# Patient Record
Sex: FEMALE | Race: WHITE | NOT HISPANIC OR LATINO | Employment: FULL TIME | URBAN - METROPOLITAN AREA
[De-identification: names, ages, dates, MRNs, and addresses within clinical notes are randomized per-mention and may not be internally consistent; named-entity substitution may affect disease eponyms.]

---

## 2023-09-19 PROBLEM — E66.3 OVERWEIGHT WITH BODY MASS INDEX (BMI) OF 29 TO 29.9 IN ADULT: Status: ACTIVE | Noted: 2023-09-19

## 2023-09-19 PROBLEM — R06.2 WHEEZING: Status: ACTIVE | Noted: 2023-09-19

## 2023-09-19 PROBLEM — R05.3 CHRONIC COUGH: Status: ACTIVE | Noted: 2023-09-19

## 2023-09-19 PROBLEM — R53.83 FATIGUE: Status: ACTIVE | Noted: 2023-09-19

## 2023-09-19 PROBLEM — K21.9 GERD (GASTROESOPHAGEAL REFLUX DISEASE): Status: ACTIVE | Noted: 2023-09-19

## 2023-09-19 PROBLEM — J44.9 OAD (OBSTRUCTIVE AIRWAY DISEASE) (MULTI): Status: ACTIVE | Noted: 2023-09-19

## 2023-09-19 RX ORDER — ALBUTEROL SULFATE 90 UG/1
2 AEROSOL, METERED RESPIRATORY (INHALATION) EVERY 4 HOURS PRN
COMMUNITY
Start: 2023-06-15

## 2023-09-19 RX ORDER — VARENICLINE TARTRATE 0.5 MG/1
TABLET, FILM COATED ORAL
COMMUNITY
Start: 2022-08-26 | End: 2023-10-12 | Stop reason: WASHOUT

## 2023-09-19 RX ORDER — FLUTICASONE PROPIONATE AND SALMETEROL 100; 50 UG/1; UG/1
1 POWDER RESPIRATORY (INHALATION) 2 TIMES DAILY
COMMUNITY
Start: 2023-07-13 | End: 2023-10-12 | Stop reason: SDUPTHER

## 2023-10-12 ENCOUNTER — OFFICE VISIT (OUTPATIENT)
Dept: PULMONOLOGY | Facility: CLINIC | Age: 48
End: 2023-10-12
Payer: COMMERCIAL

## 2023-10-12 VITALS
HEIGHT: 67 IN | TEMPERATURE: 97.6 F | OXYGEN SATURATION: 96 % | WEIGHT: 211.6 LBS | SYSTOLIC BLOOD PRESSURE: 109 MMHG | BODY MASS INDEX: 33.21 KG/M2 | HEART RATE: 83 BPM | RESPIRATION RATE: 16 BRPM | DIASTOLIC BLOOD PRESSURE: 74 MMHG

## 2023-10-12 DIAGNOSIS — J44.9 CHRONIC OBSTRUCTIVE PULMONARY DISEASE, UNSPECIFIED COPD TYPE (MULTI): Primary | ICD-10-CM

## 2023-10-12 PROCEDURE — 99214 OFFICE O/P EST MOD 30 MIN: CPT

## 2023-10-12 RX ORDER — CEPHALEXIN 250 MG/1
1 CAPSULE ORAL
Qty: 60 EACH | Refills: 11 | Status: SHIPPED | OUTPATIENT
Start: 2023-10-12 | End: 2024-02-07 | Stop reason: SDUPTHER

## 2023-10-12 NOTE — PATIENT INSTRUCTIONS
COPD  - Continue Advair 1 puff twice a day - rinse your mouth after each use to avoid oral thrush.  - Continue an albuterol HFA inhaler every 4-6 hours as needed     Post nasal drip/ seasonal allergies  - Continue cetirizine 10mg daily  - Continue flonase 1 spray each nostril daily     GERD  - Continue to take Nexium 20mg daily     Follow up in 12 months or sooner if needed

## 2023-10-12 NOTE — PROGRESS NOTES
Patient: Teresa Dooley    [id number]  : 1975 -- AGE 47 y.o.    Provider: [facility]     Location [facility]   Service Date: 10/12/2023              [facility]  Follow Up Visit Note      HISTORY OF PRESENT ILLNESS       HISTORY OF PRESENT ILLNESS   JHON Dooley is a 47 y.o. female who presents to a [facility] for a follow up evaluation for COPD. He is a former smoker (39 pack years).     I have independently interviewed and examined the patient in the office and reviewed available records.    Current History    On today's visit, the patient reports taking Advair twice a day. She states she is feeling great, that advair has made a huge difference, she says she was able to play volleyball last night. She rarely coughs or wheezes anymore and has not needed to use her albuterol inhaler. She denies GARCIA, SOB at rest, chest pain and ER visits for breathing issues.    23: Since last visit she states the albuterol has helped her wheezing and cough significantly. She uses the albuterol once a day mainly in the morning d/t feeling crummy in the mornings. She occasionally coughs and sometimes can hear wheezing after cutting the grass, albuterol helps with this. She is taking nexium for GERD with good control.      23: Mrs. Dooley has c/o of chronic cough for a couple years. She quit smoking 4 1/2 months ago and still has a cough, she coughs up clear mucus at times. She reports wheezing as well. She reports helping her  paint their boat last week then she was wheezing for 4 days. She wakes coughing with SOB and wheezing once a week or two for the past few months. She reports some seasonal allergies with nasal congestion. She reports SOB is more cough induced. She has chest tightness at times. She reports having GERD since she quit smoking and was taking too many TUMS so she started taking 20mg of  Nexium daily with good control for the past month now. Denies GARCIA and ER visits for breathing issues.    Previous pulmonary history: COPD; GOLD1    Inhalers/nebulized medications: advair, albutrol    Hospitalization History: He has not been hospitalized over the last year for breathing related problem.    Sleep history: Denies snoring, apnea, feeling tired during the day or taking naps during the day.     ALLERGIES AND MEDICATIONS     ALLERGIES  Allergies   Allergen Reactions    Erythromycin Unknown       MEDICATIONS  Current Outpatient Medications   Medication Sig Dispense Refill    albuterol 90 mcg/actuation inhaler Inhale 2 puffs every 4 hours if needed.      fluticasone propion-salmeteroL (Advair Diskus) 100-50 mcg/dose diskus inhaler Inhale 1 puff 2 times a day.      varenicline (Chantix) 0.5 mg tablet Take by mouth.  start 0.5mg po qd x3d, then 0.5mg po bid x4d, then 1mg po bid thereafter. max 2mg/day.       No current facility-administered medications for this visit.         PAST HISTORY     PAST MEDICAL HISTORY  She  has a past medical history of Encounter for gynecological examination (general) (routine) without abnormal findings and Other conditions influencing health status.    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   No known exposure to asbestos, silica, beryllium or inhaled metals.  No exposure to birds or exotic  animals.    FAMILY HISTORY  Family History   Problem Relation Name Age of Onset    Breast cancer Mother       Father - Emphysema  Maternal Aunt - Asthma  Mother - Lung cancer -.  No family history of autoimmune disorders.    REVIEW OF SYSTEMS     REVIEW OF SYSTEMS  Review of Systems    Constitutional: No fever, no chills, no night sweats.    Eyes: No double vision, no floaters, no dry eyes.   ENT: See HPI.   Neck: No neck stiffness.  Cardiovascular: No sharp chest pain, no heart racing, no leg swelling.  Respiratory: as noted in HPI.   Integumentary: No rashes or sores.  Neurological: No dizziness, no headaches. Sleeping well.  Psychiatric: No mood changes.       PHYSICAL EXAM     VITAL SIGNS:   Vitals:    10/12/23 0908   BP: 109/74   Pulse: 83   Resp: 16   Temp: 36.4 °C (97.6 °F)   SpO2: 96%     CURRENT WEIGHT: Body mass index is 33.14 kg/m².    PREVIOUS WEIGHTS:  Wt Readings from Last 3 Encounters:   23 93 kg (205 lb)   23 93.2 kg (205 lb 6.4 oz)   22 86.6 kg (191 lb)       Physical Exam    Constitutional: General appearance: Alert and oriented.  No acute distress. Well developed, well nourished.  Head and face: Symmetric  Pulmonary: Chest is normal. No increased work of breathing or signs of respiratory distress. Clear to auscultation bilaterally - no crackles, wheezing, or rhonchi.   Cardiovascular: Heart rate and rhythm normal. Normal S1, S2 - no murmurs, gallops, or pericardial rub.   Abdomen: Soft, non tender, +BS  Extremities: No edema. No clubbing or cyanosis of the fingernails.    Neurologic: Moves all four extremities   MSK: Normal movements of extremities. Gait normal   Psychiatric: Intact judgement and insight.    RESULTS/DATA     Pulmonary Function Test Results     Pulmonary Functions Testing Results:  23: FEV1/FVC 0.66 /FEV1 2.21 (72%)/FVC 3.35 (88%)/ TLC 8.31 (148%)/ RV 5.83 (312%)/ DLCO 98%    FENO 10/12/23: 14ppb        Chest Radiograph     No results found for this  or any previous visit from the past 2000 days.      Chest CT Scan     No results found for this or any previous visit from the past 365 days.      Echocardiogram     No results found for this or any previous visit from the past 365 days.         ASSESSMENT/PLAN     Mckenna Dooley is 46 y/o female who presents to a [facility] for a follow up evaluation for COPD. He is a former smoker (39 pack years).     Problem List and Orders  Diagnoses and all orders for this visit:  Chronic obstructive pulmonary disease, unspecified COPD type (CMS/[facility])  -     fluticasone propion-salmeteroL (Advair Diskus) 100-50 mcg/dose diskus inhaler; Inhale 1 puff 2 times a day. Rinse mouth with water after use to reduce aftertaste and incidence of candidiasis. Do not swallow.      Assessment and Plan / Recommendations:     Chronic Cough/ Wheezing  - START Advair 1 puff twice a day - rinse your mouth after each use to avoid oral thrush.  - Continue an albuterol HFA inhaler every 4-6 hours as needed    Post nasal drip/ seasonal allergies  - Continue cetirizine 10mg daily  - Continue flonase 1 spray each nostril daily     GERD  - Continue to take Nexium 20mg daily     Follow up in 12 months

## 2023-10-19 ENCOUNTER — APPOINTMENT (OUTPATIENT)
Dept: PULMONOLOGY | Facility: CLINIC | Age: 48
End: 2023-10-19
Payer: COMMERCIAL

## 2023-10-27 ENCOUNTER — OFFICE VISIT (OUTPATIENT)
Dept: PRIMARY CARE | Facility: CLINIC | Age: 48
End: 2023-10-27
Payer: COMMERCIAL

## 2023-10-27 VITALS
BODY MASS INDEX: 33.74 KG/M2 | TEMPERATURE: 97.1 F | DIASTOLIC BLOOD PRESSURE: 62 MMHG | HEART RATE: 68 BPM | OXYGEN SATURATION: 97 % | RESPIRATION RATE: 16 BRPM | SYSTOLIC BLOOD PRESSURE: 106 MMHG | WEIGHT: 215 LBS | HEIGHT: 67 IN

## 2023-10-27 DIAGNOSIS — K21.9 GASTROESOPHAGEAL REFLUX DISEASE, UNSPECIFIED WHETHER ESOPHAGITIS PRESENT: ICD-10-CM

## 2023-10-27 DIAGNOSIS — R05.3 CHRONIC COUGH: ICD-10-CM

## 2023-10-27 DIAGNOSIS — Z12.31 ENCOUNTER FOR SCREENING MAMMOGRAM FOR MALIGNANT NEOPLASM OF BREAST: ICD-10-CM

## 2023-10-27 DIAGNOSIS — J44.9 OAD (OBSTRUCTIVE AIRWAY DISEASE) (MULTI): ICD-10-CM

## 2023-10-27 DIAGNOSIS — Z00.00 ROUTINE GENERAL MEDICAL EXAMINATION AT A HEALTH CARE FACILITY: ICD-10-CM

## 2023-10-27 DIAGNOSIS — M54.32 LEFT SIDED SCIATICA: Primary | ICD-10-CM

## 2023-10-27 DIAGNOSIS — Z12.11 COLON CANCER SCREENING: ICD-10-CM

## 2023-10-27 PROCEDURE — 99396 PREV VISIT EST AGE 40-64: CPT | Performed by: FAMILY MEDICINE

## 2023-10-27 PROCEDURE — 4004F PT TOBACCO SCREEN RCVD TLK: CPT | Performed by: FAMILY MEDICINE

## 2023-10-27 RX ORDER — TIZANIDINE 2 MG/1
2 TABLET ORAL EVERY 6 HOURS PRN
Qty: 30 TABLET | Refills: 1 | Status: SHIPPED | OUTPATIENT
Start: 2023-10-27 | End: 2023-11-11

## 2023-10-27 NOTE — PROGRESS NOTES
"Subjective   Patient ID: JHON Dooley is a 47 y.o. female who presents for Annual Exam and Back Pain (Comes and goes since 'throwing back out' a couple of months ago/Lower left back around to hip).  Covid vax: x 4  CRC: n/a-offered  Mammogram: had done yesterday  Pap: 7/2022  Lmp: 10/3/23      HPI  Patient Active Problem List   Diagnosis    Chronic cough    Fatigue    GERD (gastroesophageal reflux disease)    OAD (obstructive airway disease) (CMS/[facility])    Wheezing    Overweight with body mass index (BMI) of 29 to 29.9 in adult       Past Surgical History:   Procedure Laterality Date    ANKLE FRACTURE SURGERY Left 2016    and 2017       Review of Systems no sz mi or cva  Had low back pain in JULY LLE sciatica    This patient has   NO history of recent Covid nor flu symptoms,  NO Fever nor chills,  NO Chest pain, shortness of breath nor paroxysmal nocturnal dyspnea,  NO Nausea, vomiting, nor diarrhea,  NO Hematochezia nor melena,  NO Dysuria, hematuria, nor new incontinence issues  NO new severe headaches nor neurological complaints,  NO new issues with anxiety nor depression nor new psychiatric complaints,  NO suicidal nor homicidal ideations.     OBJECTIVE:  /62   Pulse 68   Temp 36.2 °C (97.1 °F) (Temporal)   Resp 16   Ht 1.702 m (5' 7\")   Wt 97.5 kg (215 lb)   LMP 10/03/2023 (Approximate)   SpO2 97%   BMI 33.67 kg/m²      General:  alert, oriented, no acute distress.  No obvious skin rashes noted.   No gait disturbance noted.    Mood is pleasant, not tearful, no signs of emotional distress.  Not appearing intoxicated or altered.   No voiced delusions,   Normal, appropriate behavior.    HEENT: Normocephalic, atraumatic,   Pupils round, reactive to light  Extraocular motions intact and wnl  Tympanic membranes normal    Neck: no nuchal rigidity  No masses palpable.  No carotid bruits.  No thyromegaly.    Respiratory: Equal breath sounds  No wheezes,    rales,    nor rhonchi  No respiratory " distress.    Heart: Regular rate and rhythm, no    murmurs  no rubs/gallops    Abdomen: no masses palpable, nontender, no rebound nor guarding.overwt    Extremities: NO cyanosis noted, no clubbing.   No edema noted.  2+dorsalis pedis pulses.    Normal-not antalgic, steady gait.  Neg straight leg raise except mild on L  nL strength rom and reflexes Les bilat    No visits with results within 3 Month(s) from this visit.   Latest known visit with results is:   [facility] Encounter on 08/26/2022   Component Date Value Ref Range Status    TSH 08/26/2022 0.51  0.44 - 3.98 mIU/L Final    Comment:  TSH testing is performed using different testing    methodology at [facility] than at other    [facility]. Direct result comparisons should    only be made within the same method.      Free T4 08/26/2022 0.95  0.61 - 1.12 ng/dL Final    Comment:  Thyroxine Free testing is performed using different testing    methodology at [facility] than at other    [facility]. Direct result comparisons should    only be made within the same method.  .   Biotin can cause falsely elevated free T4 results. Patients   taking a Biotin dose of up to 10 mg/day should refrain from   taking Biotin for 24 hours before sample collection. Patient   taking a Biotin dose of >10 mg/day should consult with their   physician or the laboratory before the blood draw.      Hemoglobin A1C 08/26/2022 5.0  % Final    Comment:      Diagnosis of Diabetes-Adults   Non-Diabetic: < or = 5.6%   Increased risk for developing diabetes: 5.7-6.4%   Diagnostic of diabetes: > or = 6.5%  .       Monitoring of Diabetes                Age (y)     Therapeutic Goal (%)   Adults:          >18           <7.0   Pediatrics:    13-18           <7.5                   7-12           <8.0                   0- 6            7.5-8.5   American Diabetes Association. Diabetes Care 33(S1), Jan 2010.      Estimated Average Glucose 08/26/2022 97  MG/DL Final     Cholesterol 08/26/2022 195  0 - 199 mg/dL Final    Comment: .      AGE      DESIRABLE   BORDERLINE HIGH   HIGH     0-19 Y     0 - 169       170 - 199     >/= 200    20-24 Y     0 - 189       190 - 224     >/= 225         >24 Y     0 - 199       200 - 239     >/= 240   **All ranges are based on fasting samples. Specific   therapeutic targets will vary based on patient-specific   cardiac risk.  .   Pediatric guidelines reference:Pediatrics 2011, 128(S5).   Adult guidelines reference: NCEP ATPIII Guidelines,     CODI 2001, 258:2486-97  .   Venipuncture immediately after or during the    administration of Metamizole may lead to falsely   low results. Testing should be performed immediately   prior to Metamizole dosing.      HDL 08/26/2022 48.0  mg/dL Final    Comment: .      AGE      VERY LOW   LOW     NORMAL    HIGH       0-19 Y       < 35   < 40     40-45     ----    20-24 Y       ----   < 40       >45     ----      >24 Y       ----   < 40     40-60      >60  .      Cholesterol/HDL Ratio 08/26/2022 4.1   Final    Comment: REF VALUES  DESIRABLE  < 3.4  HIGH RISK  > 5.0      LDL 08/26/2022 133 (H)  0 - 99 mg/dL Final    Comment: .                           NEAR      BORD      AGE      DESIRABLE  OPTIMAL    HIGH     HIGH     VERY HIGH     0-19 Y     0 - 109     ---    110-129   >/= 130     ----    20-24 Y     0 - 119     ---    120-159   >/= 160     ----      >24 Y     0 -  99   100-129  130-159   160-189     >/=190  .      VLDL 08/26/2022 14  0 - 40 mg/dL Final    Triglycerides 08/26/2022 69  0 - 149 mg/dL Final    Comment: .      AGE      DESIRABLE   BORDERLINE HIGH   HIGH     VERY HIGH   0 D-90 D    19 - 174         ----         ----        ----  91 D- 9 Y     0 -  74        75 -  99     >/= 100      ----    10-19 Y     0 -  89        90 - 129     >/= 130      ----    20-24 Y     0 - 114       115 - 149     >/= 150      ----         >24 Y     0 - 149       150 - 199    200- 499    >/= 500  .   Venipuncture immediately  after or during the    administration of Metamizole may lead to falsely   low results. Testing should be performed immediately   prior to Metamizole dosing.      WBC 08/26/2022 8.5  4.4 - 11.3 x10E9/L Final    RBC 08/26/2022 5.09  4.00 - 5.20 x10E12/L Final    Hemoglobin 08/26/2022 16.1 (H)  12.0 - 16.0 g/dL Final    Hematocrit 08/26/2022 48.2 (H)  36.0 - 46.0 % Final    MCV 08/26/2022 95  80 - 100 fL Final    MCHC 08/26/2022 33.4  32.0 - 36.0 g/dL Final    Platelets 08/26/2022 279  150 - 450 x10E9/L Final    RDW 08/26/2022 12.1  11.5 - 14.5 % Final    Glucose 08/26/2022 83  74 - 99 mg/dL Final    Sodium 08/26/2022 142  136 - 145 mmol/L Final    Potassium 08/26/2022 3.9  3.5 - 5.3 mmol/L Final    Chloride 08/26/2022 110 (H)  98 - 107 mmol/L Final    Bicarbonate 08/26/2022 24  21 - 32 mmol/L Final    Anion Gap 08/26/2022 12  10 - 20 mmol/L Final    Urea Nitrogen 08/26/2022 13  6 - 23 mg/dL Final    Creatinine 08/26/2022 0.72  0.50 - 1.05 mg/dL Final    GFR Female 08/26/2022 >90  >90 mL/min/1.73m2 Final    Comment:  CALCULATIONS OF ESTIMATED GFR ARE PERFORMED   USING THE 2021 CKD-EPI STUDY REFIT EQUATION   WITHOUT THE RACE VARIABLE FOR THE IDMS-TRACEABLE   CREATININE METHODS.    https://jasn.asnjournals.org/content/early/2021/09/22/ASN.4556143816      Calcium 08/26/2022 9.1  8.6 - 10.3 mg/dL Final    Albumin 08/26/2022 4.4  3.4 - 5.0 g/dL Final    Alkaline Phosphatase 08/26/2022 53  33 - 110 U/L Final    Total Protein 08/26/2022 7.1  6.4 - 8.2 g/dL Final    AST 08/26/2022 23  9 - 39 U/L Final    Total Bilirubin 08/26/2022 0.5  0.0 - 1.2 mg/dL Final    ALT (SGPT) 08/26/2022 23  7 - 45 U/L Final    Comment:  Patients treated with Sulfasalazine may generate    falsely decreased results for ALT.          Assessment/Plan     Problem List Items Addressed This Visit       Chronic cough    Relevant Orders    CBC and Auto Differential    Comprehensive Metabolic Panel    Hemoglobin A1C    Lipid Panel    GERD (gastroesophageal  reflux disease)    Relevant Orders    CBC and Auto Differential    Comprehensive Metabolic Panel    Hemoglobin A1C    Lipid Panel    OAD (obstructive airway disease) (CMS/[facility])    Relevant Orders    CBC and Auto Differential    Comprehensive Metabolic Panel    Hemoglobin A1C    Lipid Panel     Other Visit Diagnoses       Left sided sciatica    -  Primary    Relevant Medications    tiZANidine (Zanaflex) 2 mg tablet    Other Relevant Orders    XR lumbar spine 2-3 views    Routine general medical examination at a health care facility        Relevant Orders    CBC and Auto Differential    Comprehensive Metabolic Panel    Hemoglobin A1C    Lipid Panel    Encounter for screening mammogram for malignant neoplasm of breast        Relevant Orders    CBC and Auto Differential    Comprehensive Metabolic Panel    Hemoglobin A1C    Lipid Panel    Colon cancer screening        Relevant Orders    Colonoscopy Screening; Average Risk Patient          Offered l spine xr  Offered PT  Zanaflex at bedtime prn   Not bad pain now  This medications risks, benefits, and alternatives were discussed with patient at length.  If any unwanted side effects occur-discontinue medicine and call the office for discussion.  No etoh or driving on this  Labs due  The patient is aware that results will be forthcoming of ALL planned labs and or tests. If no results are received on my chart or by letter within 1 - 3 weeks, the patient is aware they need to call to obtain results, as this is not usual. Also, if any new conditions arise, or current condition worsens, it is understood that sooner appointment should be made or urgent care/convenient care or emergency room treatment should be sought depending on severity. Otherwise follow up for recheck at regular intervals as we have discussed, at least yearly.      If worse pain-needs PT and l spine series

## 2024-02-07 ENCOUNTER — TELEPHONE (OUTPATIENT)
Dept: PULMONOLOGY | Facility: CLINIC | Age: 49
End: 2024-02-07
Payer: COMMERCIAL

## 2024-02-07 DIAGNOSIS — J44.9 CHRONIC OBSTRUCTIVE PULMONARY DISEASE, UNSPECIFIED COPD TYPE (MULTI): ICD-10-CM

## 2024-02-07 DIAGNOSIS — J44.9 CHRONIC OBSTRUCTIVE PULMONARY DISEASE, UNSPECIFIED COPD TYPE (MULTI): Primary | ICD-10-CM

## 2024-02-07 RX ORDER — FLUTICASONE PROPIONATE AND SALMETEROL 100; 50 UG/1; UG/1
1 POWDER RESPIRATORY (INHALATION)
Qty: 60 EACH | Refills: 11 | Status: SHIPPED | OUTPATIENT
Start: 2024-02-07

## 2024-02-07 RX ORDER — FLUTICASONE PROPIONATE AND SALMETEROL XINAFOATE 115; 21 UG/1; UG/1
2 AEROSOL, METERED RESPIRATORY (INHALATION)
Qty: 12 G | Refills: 3 | Status: SHIPPED | OUTPATIENT
Start: 2024-02-07

## 2024-02-07 NOTE — PROGRESS NOTES
Pt reached out via [employer] regarding her Advair diskus. She stated that her insurance is no longer covering brand name Advair diskus, but will cover generic. Per Belgica Andrews CNP, ok for pt to take generic Advair diskus. Order placed and routed to Belgica Andrews CNP to sign.

## 2024-02-09 ENCOUNTER — TELEPHONE (OUTPATIENT)
Dept: PULMONOLOGY | Facility: CLINIC | Age: 49
End: 2024-02-09
Payer: COMMERCIAL

## 2024-02-09 NOTE — TELEPHONE ENCOUNTER
Received a PA request from [employer] [facility] for Advair Diskus 100-50 mcg/ACT Aerosol Powder.    Submitted through [employer].    Key - [id number]    Outcome:  Coverage has been terminated.

## 2024-07-16 DIAGNOSIS — J44.9 CHRONIC OBSTRUCTIVE PULMONARY DISEASE, UNSPECIFIED COPD TYPE (MULTI): Primary | ICD-10-CM

## 2024-07-17 RX ORDER — ALBUTEROL SULFATE 90 UG/1
2 AEROSOL, METERED RESPIRATORY (INHALATION) EVERY 4 HOURS PRN
Qty: 18 G | Refills: 1 | Status: SHIPPED | OUTPATIENT
Start: 2024-07-17 | End: 2025-07-17

## 2024-10-14 ENCOUNTER — APPOINTMENT (OUTPATIENT)
Dept: PRIMARY CARE | Facility: CLINIC | Age: 49
End: 2024-10-14
Payer: COMMERCIAL

## 2024-10-14 VITALS
HEART RATE: 74 BPM | WEIGHT: 196 LBS | SYSTOLIC BLOOD PRESSURE: 106 MMHG | BODY MASS INDEX: 30.76 KG/M2 | HEIGHT: 67 IN | DIASTOLIC BLOOD PRESSURE: 62 MMHG | RESPIRATION RATE: 16 BRPM | OXYGEN SATURATION: 98 % | TEMPERATURE: 97.5 F

## 2024-10-14 DIAGNOSIS — J44.9 OAD (OBSTRUCTIVE AIRWAY DISEASE) (MULTI): ICD-10-CM

## 2024-10-14 DIAGNOSIS — Z00.00 ROUTINE GENERAL MEDICAL EXAMINATION AT A HEALTH CARE FACILITY: ICD-10-CM

## 2024-10-14 DIAGNOSIS — R05.3 CHRONIC COUGH: ICD-10-CM

## 2024-10-14 DIAGNOSIS — Z80.0 FAMILY HISTORY OF COLON CANCER: ICD-10-CM

## 2024-10-14 DIAGNOSIS — Z12.11 COLON CANCER SCREENING: Primary | ICD-10-CM

## 2024-10-14 PROCEDURE — 99396 PREV VISIT EST AGE 40-64: CPT | Performed by: FAMILY MEDICINE

## 2024-10-14 PROCEDURE — 3008F BODY MASS INDEX DOCD: CPT | Performed by: FAMILY MEDICINE

## 2024-10-14 PROCEDURE — 1036F TOBACCO NON-USER: CPT | Performed by: FAMILY MEDICINE

## 2024-10-14 NOTE — PROGRESS NOTES
"Subjective   Patient ID: Teresa Dooley \"ESTEVAN" is a 48 y.o. female who presents for Annual Exam.  Covid vax: x 4  Flu: declined     Mammogram: 8/2022-seeing ob/gyn this week  Pap: 7/2022 per pt--seeing ob/gyn this week  Lmp:   9/27/24  Offered scope -had colon ca in family  Covid in 8-2024    HPI  Patient Active Problem List   Diagnosis    Chronic cough    Fatigue    GERD (gastroesophageal reflux disease)    OAD (obstructive airway disease) (Multi)    Wheezing    Overweight with body mass index (BMI) of 29 to 29.9 in adult       Past Surgical History:   Procedure Laterality Date    ANKLE FRACTURE SURGERY Left 2016    and 2017   Intermittent fasting diet      Review of Systems  This patient has  NO history of seizures/ CAD or CVA    NO history of recent Covid nor flu symptoms,  NO Fever nor chills,  NO Chest pain, shortness of breath nor paroxysmal nocturnal dyspnea,  NO Nausea, vomiting, nor diarrhea,  NO Hematochezia nor melena,  NO Dysuria, hematuria, nor new incontinence issues  NO new severe headaches nor neurological complaints,  NO new issues with anxiety nor depression nor new psychiatric complaints,  NO suicidal nor homicidal ideations.     OBJECTIVE:  /62   Pulse 74   Temp 36.4 °C (97.5 °F) (Temporal)   Resp 16   Ht 1.702 m (5' 7\")   Wt 88.9 kg (196 lb)   LMP 09/27/2024 (Exact Date)   SpO2 98%   BMI 30.70 kg/m²      General:  alert, oriented, no acute distress.  No obvious skin rashes noted.   No gait disturbance noted.    Mood is pleasant,  no signs of emotional distress.   Not appearing intoxicated or altered.   No voiced delusions,   Normal, appropriate behavior.    HEENT: Normocephalic, atraumatic,   Pupils round, reactive to light  Extraocular motions intact and wnl  Tympanic membranes normal    Neck: no nuchal rigidity  No masses palpable.  No carotid bruits.  No thyromegaly.    Respiratory: Equal breath sounds  No wheezes,    rales,    nor rhonchi  No respiratory distress.    Heart: " Regular rate and rhythm, no    murmurs  no rubs/gallops    Abdomen: no masses palpable, nontender, no rebound nor guarding.    Extremities: NO cyanosis noted, no clubbing.   No edema noted.  2+dorsalis pedis pulses.    Normal-not antalgic, steady gait.    No visits with results within 3 Month(s) from this visit.   Latest known visit with results is:   [facility] Encounter on 08/26/2022   Component Date Value Ref Range Status    TSH 08/26/2022 0.51  0.44 - 3.98 mIU/L Final    Comment:  TSH testing is performed using different testing    methodology at [facility] than at other    [facility]. Direct result comparisons should    only be made within the same method.      Free T4 08/26/2022 0.95  0.61 - 1.12 ng/dL Final    Comment:  Thyroxine Free testing is performed using different testing    methodology at [facility] than at other    [facility]. Direct result comparisons should    only be made within the same method.  .   Biotin can cause falsely elevated free T4 results. Patients   taking a Biotin dose of up to 10 mg/day should refrain from   taking Biotin for 24 hours before sample collection. Patient   taking a Biotin dose of >10 mg/day should consult with their   physician or the laboratory before the blood draw.      Hemoglobin A1C 08/26/2022 5.0  % Final    Comment:      Diagnosis of Diabetes-Adults   Non-Diabetic: < or = 5.6%   Increased risk for developing diabetes: 5.7-6.4%   Diagnostic of diabetes: > or = 6.5%  .       Monitoring of Diabetes                Age (y)     Therapeutic Goal (%)   Adults:          >18           <7.0   Pediatrics:    13-18           <7.5                   7-12           <8.0                   0- 6            7.5-8.5   American Diabetes Association. Diabetes Care 33(S1), Jan 2010.      Estimated Average Glucose 08/26/2022 97  MG/DL Final    Cholesterol 08/26/2022 195  0 - 199 mg/dL Final    Comment: .      AGE      DESIRABLE   BORDERLINE HIGH    HIGH     0-19 Y     0 - 169       170 - 199     >/= 200    20-24 Y     0 - 189       190 - 224     >/= 225         >24 Y     0 - 199       200 - 239     >/= 240   **All ranges are based on fasting samples. Specific   therapeutic targets will vary based on patient-specific   cardiac risk.  .   Pediatric guidelines reference:Pediatrics 2011, 128(S5).   Adult guidelines reference: NCEP ATPIII Guidelines,     CODI 2001, 258:5216-97  .   Venipuncture immediately after or during the    administration of Metamizole may lead to falsely   low results. Testing should be performed immediately   prior to Metamizole dosing.      HDL 08/26/2022 48.0  mg/dL Final    Comment: .      AGE      VERY LOW   LOW     NORMAL    HIGH       0-19 Y       < 35   < 40     40-45     ----    20-24 Y       ----   < 40       >45     ----      >24 Y       ----   < 40     40-60      >60  .      Cholesterol/HDL Ratio 08/26/2022 4.1   Final    Comment: REF VALUES  DESIRABLE  < 3.4  HIGH RISK  > 5.0      LDL 08/26/2022 133 (H)  0 - 99 mg/dL Final    Comment: .                           NEAR      BORD      AGE      DESIRABLE  OPTIMAL    HIGH     HIGH     VERY HIGH     0-19 Y     0 - 109     ---    110-129   >/= 130     ----    20-24 Y     0 - 119     ---    120-159   >/= 160     ----      >24 Y     0 -  99   100-129  130-159   160-189     >/=190  .      VLDL 08/26/2022 14  0 - 40 mg/dL Final    Triglycerides 08/26/2022 69  0 - 149 mg/dL Final    Comment: .      AGE      DESIRABLE   BORDERLINE HIGH   HIGH     VERY HIGH   0 D-90 D    19 - 174         ----         ----        ----  91 D- 9 Y     0 -  74        75 -  99     >/= 100      ----    10-19 Y     0 -  89        90 - 129     >/= 130      ----    20-24 Y     0 - 114       115 - 149     >/= 150      ----         >24 Y     0 - 149       150 - 199    200- 499    >/= 500  .   Venipuncture immediately after or during the    administration of Metamizole may lead to falsely   low results. Testing should be  performed immediately   prior to Metamizole dosing.      WBC 08/26/2022 8.5  4.4 - 11.3 x10E9/L Final    RBC 08/26/2022 5.09  4.00 - 5.20 x10E12/L Final    Hemoglobin 08/26/2022 16.1 (H)  12.0 - 16.0 g/dL Final    Hematocrit 08/26/2022 48.2 (H)  36.0 - 46.0 % Final    MCV 08/26/2022 95  80 - 100 fL Final    MCHC 08/26/2022 33.4  32.0 - 36.0 g/dL Final    Platelets 08/26/2022 279  150 - 450 x10E9/L Final    RDW 08/26/2022 12.1  11.5 - 14.5 % Final    Glucose 08/26/2022 83  74 - 99 mg/dL Final    Sodium 08/26/2022 142  136 - 145 mmol/L Final    Potassium 08/26/2022 3.9  3.5 - 5.3 mmol/L Final    Chloride 08/26/2022 110 (H)  98 - 107 mmol/L Final    Bicarbonate 08/26/2022 24  21 - 32 mmol/L Final    Anion Gap 08/26/2022 12  10 - 20 mmol/L Final    Urea Nitrogen 08/26/2022 13  6 - 23 mg/dL Final    Creatinine 08/26/2022 0.72  0.50 - 1.05 mg/dL Final    GFR Female 08/26/2022 >90  >90 mL/min/1.73m2 Final    Comment:  CALCULATIONS OF ESTIMATED GFR ARE PERFORMED   USING THE 2021 CKD-EPI STUDY REFIT EQUATION   WITHOUT THE RACE VARIABLE FOR THE IDMS-TRACEABLE   CREATININE METHODS.    https://jasn.asnjournals.org/content/early/2021/09/22/ASN.9900630308      Calcium 08/26/2022 9.1  8.6 - 10.3 mg/dL Final    Albumin 08/26/2022 4.4  3.4 - 5.0 g/dL Final    Alkaline Phosphatase 08/26/2022 53  33 - 110 U/L Final    Total Protein 08/26/2022 7.1  6.4 - 8.2 g/dL Final    AST 08/26/2022 23  9 - 39 U/L Final    Total Bilirubin 08/26/2022 0.5  0.0 - 1.2 mg/dL Final    ALT (SGPT) 08/26/2022 23  7 - 45 U/L Final    Comment:  Patients treated with Sulfasalazine may generate    falsely decreased results for ALT.          Assessment/Plan     Problem List Items Addressed This Visit       Chronic cough    Relevant Orders    CBC and Auto Differential    Comprehensive Metabolic Panel    Hemoglobin A1C    Lipid Panel    OAD (obstructive airway disease) (Multi)    Relevant Orders    CBC and Auto Differential    Comprehensive Metabolic Panel     Hemoglobin A1C    Lipid Panel     Other Visit Diagnoses       Routine general medical examination at a health care facility        Relevant Orders    CBC and Auto Differential    Comprehensive Metabolic Panel    Hemoglobin A1C    Lipid Panel    Thyroid Stimulating Hormone    Thyroxine, Free            Follow up at next scheduled visit -as planned  Labs due  The patient is aware that results will be forthcoming of ALL planned labs and or tests. If no results are received on my chart or by letter within 1 - 3 weeks, the patient is aware they need to call to obtain results, as this is not usual. Also, if any new conditions arise, or current condition worsens, it is understood that sooner appointment should be made or urgent care/convenient care or emergency room treatment should be sought depending on severity. Otherwise follow up for recheck at regular intervals as we have discussed, at least yearly.      See me 6-12mo  Diet-exercise discussed

## 2024-10-14 NOTE — PROGRESS NOTES
Covid vax: x 4  Flu: declined    Mammogram: 8/2022-seeing ob/gyn this week  Pap: 7/2022 per pt--seeing ob/gyn this week  Lmp:   9/27/24

## 2024-10-17 NOTE — PROGRESS NOTES
Patient: Teresa Dooley    [id number]  : 1975 -- AGE 48 y.o.    Provider: VIJAY Nichols     Location [facility]   Service Date: 10/22/24            [facility]  Follow Up Visit Note      HISTORY OF PRESENT ILLNESS       HISTORY OF PRESENT ILLNESS   JHON Dooley is a 48 y.o. female with a history of COPD, GERD , who presents to a [facility] for a follow up evaluation for COPD. She is a former smoker (39 pack years).     I have independently interviewed and examined the patient in the office and reviewed available records.    Current History    Since last visit she reports her respiratory status is stable.  She uses advair -21mcg mostly in the evening.  She has been playing via [employer] on Wednesday nights.  Reports vaping a couple times a day.    10/12/23: On today's visit, the patient reports taking Advair twice a day. She states she is feeling great, that advair has made a huge difference, she says she was able to play volleyball last night. She rarely coughs or wheezes anymore and has not needed to use her albuterol inhaler. She denies GARCIA, SOB at rest, chest pain and ER visits for breathing issues.    23: Since last visit she states the albuterol has helped her wheezing and cough significantly. She uses the albuterol once a day mainly in the morning d/t feeling crummy in the mornings. She occasionally coughs and sometimes can hear wheezing after cutting the grass, albuterol helps with this. She is taking nexium for GERD with good control.      23: Mrs. Dooley has c/o of chronic cough for a couple years. She quit smoking 4 1/2 months ago and still has a cough, she coughs up clear mucus at times. She reports wheezing as well. She reports helping her  paint their boat last week then she was wheezing for 4 days. She wakes coughing with SOB and wheezing once a week or two for the past  few months. She reports some seasonal allergies with nasal congestion. She reports SOB is more cough induced. She has chest tightness at times. She reports having GERD since she quit smoking and was taking too many TUMS so she started taking 20mg of Nexium daily with good control for the past month now. Denies GARCIA and ER visits for breathing issues.    Previous pulmonary history: COPD; GOLD1    Inhalers/nebulized medications: advair, albutrol    Hospitalization History: He has not been hospitalized over the last year for breathing related problem.    Sleep history: Denies snoring, apnea, feeling tired during the day or taking naps during the day.     ALLERGIES AND MEDICATIONS     ALLERGIES  Allergies   Allergen Reactions    Erythromycin Unknown       MEDICATIONS  Current Outpatient Medications   Medication Sig Dispense Refill    albuterol 90 mcg/actuation inhaler Inhale 2 puffs every 4 hours if needed for wheezing or shortness of breath. 18 g 1    fluticasone propion-salmeteroL (Advair HFA) 115-21 mcg/actuation inhaler Inhale 2 puffs 2 times a day. Rinse mouth with water after use to reduce aftertaste and incidence of candidiasis. Do not swallow. 12 g 3    tiZANidine (Zanaflex) 2 mg tablet Take 1 tablet (2 mg) by mouth every 6 hours if needed for muscle spasms for up to 15 days. 30 tablet 1     No current facility-administered medications for this visit.         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   No known exposure to asbestos, silica, beryllium or inhaled metals.  No exposure to birds or exotic animals.    FAMILY HISTORY  Family History   Problem Relation Name Age of Onset    Breast cancer Mother       Father - Emphysema  Maternal Aunt - Asthma  Mother - Lung cancer -.  No family history of autoimmune disorders.    REVIEW OF SYSTEMS     REVIEW OF SYSTEMS  Review of Systems    Constitutional: No fever, no chills, no night sweats.    Eyes: No double vision, no floaters, no dry eyes.   ENT: See HPI.   Neck: No neck stiffness.  Cardiovascular: No sharp chest pain, no heart racing, no leg swelling.  Respiratory: as noted in HPI.   Integumentary: No rashes or sores.  Neurological: No dizziness, no headaches. Sleeping well.  Psychiatric: No mood changes.       PHYSICAL EXAM     VITAL SIGNS:   Vitals:    10/22/24 0935   BP: 107/68   Pulse: 65   Temp: 36.7 °C (98.1 °F)   SpO2: 96%         CURRENT WEIGHT: Body mass index is 30.17 kg/m².      PREVIOUS WEIGHTS:  Wt Readings from Last 3 Encounters:   10/14/24 88.9 kg (196 lb)   10/27/23 97.5 kg (215 lb)   10/12/23 96 kg (211 lb 9.6 oz)       Physical Exam    Constitutional: General appearance: Alert and oriented.  No acute distress. Well developed, well nourished.  Head and face: Symmetric  Pulmonary: Chest is normal. No increased work of breathing or signs of respiratory distress. Clear to auscultation bilaterally - no crackles, wheezing, or rhonchi.   Cardiovascular: Heart rate and rhythm normal. Normal S1, S2 - no murmurs, gallops, or pericardial rub.   Abdomen: Soft, non tender, +BS  Extremities: No edema. No clubbing or cyanosis of the fingernails.    Neurologic: Moves all four extremities   MSK: Normal movements of extremities. Gait normal   Psychiatric: Intact judgement and insight.    RESULTS/DATA     Pulmonary Function Test Results                    Chest Radiograph     No results found for this or any previous visit from the past 2000 days.      Chest CT Scan     No results found for this or any previous visit from the past 365 days.      Echocardiogram     No results found for this or any previous visit from the past 365 days.         ASSESSMENT/PLAN     Mckenna Dooley is 48 y/o female with a history of COPD, GERD , who presents to a [facility] for a follow up evaluation for COPD. She is a former smoker (39 pack years).       Problem List and Orders  Problem List Items Addressed This Visit    None  Visit Diagnoses       Chronic obstructive pulmonary disease, unspecified COPD type (Multi)                Assessment and Plan / Recommendations:     Chronic Cough/ Wheezing  - =Continue Advair 1 puff twice a day - rinse your mouth after each use to avoid oral thrush.  - Continue an albuterol HFA inhaler every 4-6 hours as needed    Post nasal drip/ seasonal allergies  - Continue cetirizine 10mg daily  - Continue flonase 1 spray each nostril daily     GERD  - Continue to take Nexium 20mg daily     Follow up in 12 months        If you have any questions please call the office [phone]    Thank you for visiting the Pulmonary clinic today!   Belgica Andrews CNP  [phone]

## 2024-10-22 ENCOUNTER — APPOINTMENT (OUTPATIENT)
Dept: PULMONOLOGY | Facility: CLINIC | Age: 49
End: 2024-10-22
Payer: COMMERCIAL

## 2024-10-22 VITALS
OXYGEN SATURATION: 96 % | WEIGHT: 192.6 LBS | SYSTOLIC BLOOD PRESSURE: 107 MMHG | BODY MASS INDEX: 30.17 KG/M2 | TEMPERATURE: 98.1 F | HEART RATE: 65 BPM | DIASTOLIC BLOOD PRESSURE: 68 MMHG

## 2024-10-22 DIAGNOSIS — J44.9 CHRONIC OBSTRUCTIVE PULMONARY DISEASE, UNSPECIFIED COPD TYPE (MULTI): ICD-10-CM

## 2024-10-22 PROCEDURE — 99214 OFFICE O/P EST MOD 30 MIN: CPT

## 2024-10-22 RX ORDER — FLUTICASONE PROPIONATE AND SALMETEROL XINAFOATE 115; 21 UG/1; UG/1
2 AEROSOL, METERED RESPIRATORY (INHALATION)
Qty: 12 G | Refills: 11 | Status: SHIPPED | OUTPATIENT
Start: 2024-10-22

## 2024-11-22 ENCOUNTER — TELEPHONE (OUTPATIENT)
Dept: GASTROENTEROLOGY | Facility: CLINIC | Age: 49
End: 2024-11-22
Payer: COMMERCIAL

## 2024-11-24 DIAGNOSIS — J44.9 CHRONIC OBSTRUCTIVE PULMONARY DISEASE, UNSPECIFIED COPD TYPE (MULTI): ICD-10-CM

## 2024-11-25 RX ORDER — FLUTICASONE PROPIONATE AND SALMETEROL XINAFOATE 115; 21 UG/1; UG/1
2 AEROSOL, METERED RESPIRATORY (INHALATION) 2 TIMES DAILY
Qty: 12 G | Refills: 11 | Status: SHIPPED | OUTPATIENT
Start: 2024-11-25

## 2024-12-03 DIAGNOSIS — J44.9 CHRONIC OBSTRUCTIVE PULMONARY DISEASE, UNSPECIFIED COPD TYPE (MULTI): Primary | ICD-10-CM

## 2024-12-03 RX ORDER — BUDESONIDE AND FORMOTEROL FUMARATE DIHYDRATE 80; 4.5 UG/1; UG/1
2 AEROSOL RESPIRATORY (INHALATION)
Qty: 10.2 G | Refills: 3 | Status: SHIPPED | OUTPATIENT
Start: 2024-12-03

## 2024-12-03 NOTE — PROGRESS NOTES
Order budesonide-formoterol 80-4.5mcg due to insurance no longer covering fluticasone-salmeterol 115-21 mcg.

## 2025-01-14 DIAGNOSIS — J44.9 CHRONIC OBSTRUCTIVE PULMONARY DISEASE, UNSPECIFIED COPD TYPE (MULTI): ICD-10-CM

## 2025-01-16 RX ORDER — ALBUTEROL SULFATE 90 UG/1
2 INHALANT RESPIRATORY (INHALATION) EVERY 4 HOURS PRN
Qty: 8.5 G | Refills: 4 | Status: SHIPPED | OUTPATIENT
Start: 2025-01-16

## 2025-02-06 ENCOUNTER — HOSPITAL ENCOUNTER (OUTPATIENT)
Dept: RADIOLOGY | Facility: HOSPITAL | Age: 50
Discharge: HOME | End: 2025-02-06
Payer: COMMERCIAL

## 2025-02-06 ENCOUNTER — OFFICE VISIT (OUTPATIENT)
Dept: ORTHOPEDIC SURGERY | Facility: CLINIC | Age: 50
End: 2025-02-06
Payer: COMMERCIAL

## 2025-02-06 VITALS — BODY MASS INDEX: 29.03 KG/M2 | HEIGHT: 67 IN | WEIGHT: 185 LBS

## 2025-02-06 DIAGNOSIS — S76.312A HAMSTRING STRAIN, LEFT, INITIAL ENCOUNTER: ICD-10-CM

## 2025-02-06 DIAGNOSIS — M25.562 ACUTE PAIN OF LEFT KNEE: ICD-10-CM

## 2025-02-06 DIAGNOSIS — S83.92XA SPRAIN OF LEFT KNEE, INITIAL ENCOUNTER: Primary | ICD-10-CM

## 2025-02-06 PROCEDURE — 99204 OFFICE O/P NEW MOD 45 MIN: CPT | Performed by: STUDENT IN AN ORGANIZED HEALTH CARE EDUCATION/TRAINING PROGRAM

## 2025-02-06 PROCEDURE — L1812 KO ELASTIC W/JOINTS PRE OTS: HCPCS | Performed by: STUDENT IN AN ORGANIZED HEALTH CARE EDUCATION/TRAINING PROGRAM

## 2025-02-06 PROCEDURE — 73564 X-RAY EXAM KNEE 4 OR MORE: CPT | Mod: LT

## 2025-02-06 PROCEDURE — 3008F BODY MASS INDEX DOCD: CPT | Performed by: STUDENT IN AN ORGANIZED HEALTH CARE EDUCATION/TRAINING PROGRAM

## 2025-02-06 RX ORDER — NAPROXEN 500 MG/1
500 TABLET ORAL
Qty: 28 TABLET | Refills: 1 | Status: SHIPPED | OUTPATIENT
Start: 2025-02-06 | End: 2025-03-06

## 2025-02-06 ASSESSMENT — PATIENT HEALTH QUESTIONNAIRE - PHQ9
SUM OF ALL RESPONSES TO PHQ9 QUESTIONS 1 AND 2: 0
1. LITTLE INTEREST OR PLEASURE IN DOING THINGS: NOT AT ALL
2. FEELING DOWN, DEPRESSED OR HOPELESS: NOT AT ALL

## 2025-02-06 NOTE — PROGRESS NOTES
"  Acute Injury New Patient Visit    HPI: Teresa \"ESTEVAN" is a 49 y.o.female who presents today with new complaints of left knee injury.  States that last Wednesday she was playing volleyball, landed on her left leg awkwardly and twisted her knee.  She felt a pop.  She has some pain posteriorly and laterally.  She feels like her knee is unstable.  She has popping clicking.  She had some swelling initially.  She tried ice and stretches.  She has no prior issues with the knee.  She denies any hip pain.  She denies any numbness and tingling.  She does have some pain into the posterior calf laterally as well.    Plan: For this left knee sprain, likely mild MCL sprain, as well as hamstring and lateral gastroc strain, we will place her in a free sport knee brace, start her on naproxen, and order physical therapy.  Additionally, discussed conservative treatment measures including rest, ice, elevation, compression, and over-the-counter analgesia as needed and as appropriate.  Risks of NSAID use, steroid use, and muscle relaxers discussed in depth and considered in light of medical comorbidities.  Patient, and parent/guardian as applicable, understand agree with plan.  Follow-up: 3 weeks  X-rays on follow-up: None, but would consider an MRI at follow-up if she is not better.      Assessment:   Problem List Items Addressed This Visit    None  Visit Diagnoses       Sprain of left knee, initial encounter    -  Primary    Relevant Orders    Referral to Physical Therapy    Lateral Knee Stabilizer w/ Hinge    Acute pain of left knee        Relevant Orders    XR knee left 4+ views    Hamstring strain, left, initial encounter        Relevant Orders    Referral to Physical Therapy    Lateral Knee Stabilizer w/ Hinge            Diagnostics: Reviewed all relevant imaging including x-ray, MRI, CT, and US.      Procedure:  Procedures    Physical Exam:  GENERAL:  No obvious acute distress.  NEURO:  Distally neurovascularly intact.  Sensation " intact to light touch.  Extremity: Left knee examination shows:  Skin is intact;  No erythema or warmth;  No effusion;  Can flex the left knee to 130 degrees;  Full extension at 0 degrees;  Mildly TENDER over the medial joint line, primarily overlying the path of the MCL;  No pain over the lateral joint line;  No pain over the patellar or quadricep tendon;  No pain over the proximal tibia;  No pain over the popliteal fossa;  TENDER over the distal lateral hamstrings tendon as well as the proximal lateral gastroc tendon;  Equivocal valgus stress test;  Negative varus stress test;  Equivocal Sergei's test medially with no instability with some minor popping but no significant pain;  Negative Sergei's test laterally with no instability;  Negative Lachman's test;  Patellar and quadricep mechanism intact;  Negative anterior and posterior drawer test;  Negative patellar apprehension test;  Distal pulses are palpable;  Neurovascularly intact; and  Walking with no significant antalgic gait.      Orders Placed This Encounter    Lateral Knee Stabilizer w/ Hinge    XR knee left 4+ views    Referral to Physical Therapy      At the conclusion of the visit there were no further questions by the patient/family regarding their plan of care.  Patient was instructed to call or return with any issues, questions, or concerns regarding their injury and/or treatment plan described above.     02/06/25 at 9:41 AM - Tye Mercado DO    Office: [phone]    This note was prepared using voice recognition software.  The details of this note are correct and have been reviewed, and corrected to the best of my ability.  Some grammatical errors may persist related to the Dragon software.

## 2025-02-25 ENCOUNTER — APPOINTMENT (OUTPATIENT)
Dept: PHYSICAL THERAPY | Facility: HOSPITAL | Age: 50
End: 2025-02-25
Payer: COMMERCIAL

## 2025-02-27 ENCOUNTER — APPOINTMENT (OUTPATIENT)
Dept: ORTHOPEDIC SURGERY | Facility: CLINIC | Age: 50
End: 2025-02-27
Payer: COMMERCIAL

## 2025-02-27 DIAGNOSIS — S83.92XA SPRAIN OF LEFT KNEE, INITIAL ENCOUNTER: Primary | ICD-10-CM

## 2025-02-27 PROCEDURE — 99213 OFFICE O/P EST LOW 20 MIN: CPT | Performed by: STUDENT IN AN ORGANIZED HEALTH CARE EDUCATION/TRAINING PROGRAM

## 2025-02-27 RX ORDER — NAPROXEN 500 MG/1
500 TABLET ORAL
Qty: 28 TABLET | Refills: 1 | Status: SHIPPED | OUTPATIENT
Start: 2025-02-27 | End: 2025-03-27

## 2025-02-27 NOTE — PROGRESS NOTES
"  Acute Injury New Patient Visit    HPI: Teresa \"ESTEVAN" is a 49 y.o.female who presents today for follow-up of her left knee sprain, likely mild MCL sprain as well as hamstring and lateral gastroc strain.  States that she is 75% better.  She has done 4 sessions of physical therapy.  She has been doing her home exercises.  The naproxen helped.  She denies any interval falls or injuries.  She does notice the pain localizing more medially now.  She was starting to notice some tightness in her hips, but the stretching from physical therapy has helped.    On 2/6/2025, she presented with new complaints of left knee injury.  States that last Wednesday she was playing volleyball, landed on her left leg awkwardly and twisted her knee.  She felt a pop.  She has some pain posteriorly and laterally.  She feels like her knee is unstable.  She has popping clicking.  She had some swelling initially.  She tried ice and stretches.  She has no prior issues with the knee.  She denies any hip pain.  She denies any numbness and tingling.  She does have some pain into the posterior calf laterally as well.    Plan: Today, on 2/27/2025, we will continue physical therapy, home exercises, the brace when she is active, can return to volleyball activities in the brace progressively as she feels comfortable, and we will refill her naproxen.    On 2/6/2025, for this left knee sprain, likely mild MCL sprain, as well as hamstring and lateral gastroc strain, we will place her in a free sport knee brace, start her on naproxen, and order physical therapy.  Additionally, discussed conservative treatment measures including rest, ice, elevation, compression, and over-the-counter analgesia as needed and as appropriate.  Risks of NSAID use, steroid use, and muscle relaxers discussed in depth and considered in light of medical comorbidities.  Patient, and parent/guardian as applicable, understand agree with plan.  Follow-up: 3 to 4 weeks  X-rays on " follow-up: None, but would consider an MRI at follow-up if she is not better.      Assessment:   Problem List Items Addressed This Visit    None  Visit Diagnoses       Sprain of left knee, initial encounter    -  Primary    Relevant Medications    naproxen (Naprosyn) 500 mg tablet              Diagnostics: Reviewed all relevant imaging including x-ray, MRI, CT, and US.      Procedure:  Procedures    Physical Exam:  GENERAL:  No obvious acute distress.  NEURO:  Distally neurovascularly intact.  Sensation intact to light touch.  Extremity: Left knee examination shows:  Skin is intact;  No erythema or warmth;  No effusion;  Can flex the left knee to 130 degrees;  Full extension at 0 degrees;  Only somewhat TENDER over the medial joint line, primarily overlying the path of the MCL;  No pain over the lateral joint line;  No pain over the patellar or quadricep tendon;  No pain over the proximal tibia;  No pain over the popliteal fossa;  Improved TENDER over the distal lateral hamstrings tendon as well as the proximal lateral gastroc tendon;  Equivocal valgus stress test;  Negative varus stress test;  Equivocal Sergei's test medially with no instability with some minor popping but no significant pain;  Negative Sergei's test laterally with no instability;  Negative Lachman's test;  Patellar and quadricep mechanism intact;  Negative anterior and posterior drawer test;  Negative patellar apprehension test;  Distal pulses are palpable;  Neurovascularly intact; and  Walking with no significant antalgic gait.      Orders Placed This Encounter    naproxen (Naprosyn) 500 mg tablet      At the conclusion of the visit there were no further questions by the patient/family regarding their plan of care.  Patient was instructed to call or return with any issues, questions, or concerns regarding their injury and/or treatment plan described above.     02/27/25 at 3:19 PM - Tye Mercado DO    Office: [phone]    This note  was prepared using voice recognition software.  The details of this note are correct and have been reviewed, and corrected to the best of my ability.  Some grammatical errors may persist related to the Dragon software.

## 2025-03-21 ENCOUNTER — APPOINTMENT (OUTPATIENT)
Dept: ORTHOPEDIC SURGERY | Facility: CLINIC | Age: 50
End: 2025-03-21
Payer: COMMERCIAL

## 2025-03-21 DIAGNOSIS — S83.92XA SPRAIN OF LEFT KNEE, INITIAL ENCOUNTER: Primary | ICD-10-CM

## 2025-06-10 DIAGNOSIS — Z12.31 ENCOUNTER FOR SCREENING MAMMOGRAM FOR BREAST CANCER: ICD-10-CM

## 2025-10-22 ENCOUNTER — APPOINTMENT (OUTPATIENT)
Dept: PULMONOLOGY | Facility: CLINIC | Age: 50
End: 2025-10-22
Payer: COMMERCIAL

## 2025-11-01 ENCOUNTER — APPOINTMENT (OUTPATIENT)
Dept: PRIMARY CARE | Facility: CLINIC | Age: 50
End: 2025-11-01